# Patient Record
Sex: MALE | Race: BLACK OR AFRICAN AMERICAN | NOT HISPANIC OR LATINO
[De-identification: names, ages, dates, MRNs, and addresses within clinical notes are randomized per-mention and may not be internally consistent; named-entity substitution may affect disease eponyms.]

---

## 2020-01-07 PROBLEM — Z00.00 ENCOUNTER FOR PREVENTIVE HEALTH EXAMINATION: Status: ACTIVE | Noted: 2020-01-07

## 2020-01-13 ENCOUNTER — APPOINTMENT (OUTPATIENT)
Dept: ORTHOPEDIC SURGERY | Facility: CLINIC | Age: 50
End: 2020-01-13
Payer: MEDICAID

## 2020-01-13 VITALS — DIASTOLIC BLOOD PRESSURE: 61 MMHG | HEART RATE: 66 BPM | SYSTOLIC BLOOD PRESSURE: 115 MMHG

## 2020-01-13 VITALS — HEIGHT: 72 IN | BODY MASS INDEX: 28.44 KG/M2 | WEIGHT: 210 LBS

## 2020-01-13 DIAGNOSIS — Z78.9 OTHER SPECIFIED HEALTH STATUS: ICD-10-CM

## 2020-01-13 DIAGNOSIS — Z87.39 PERSONAL HISTORY OF OTHER DISEASES OF THE MUSCULOSKELETAL SYSTEM AND CONNECTIVE TISSUE: ICD-10-CM

## 2020-01-13 DIAGNOSIS — F19.90 OTHER PSYCHOACTIVE SUBSTANCE USE, UNSPECIFIED, UNCOMPLICATED: ICD-10-CM

## 2020-01-13 PROCEDURE — 72100 X-RAY EXAM L-S SPINE 2/3 VWS: CPT

## 2020-01-13 PROCEDURE — 99204 OFFICE O/P NEW MOD 45 MIN: CPT

## 2020-01-13 RX ORDER — METAXALONE 800 MG/1
800 TABLET ORAL
Refills: 0 | Status: ACTIVE | COMMUNITY

## 2020-01-13 RX ORDER — IBUPROFEN 800 MG/1
TABLET, FILM COATED ORAL
Refills: 0 | Status: ACTIVE | COMMUNITY

## 2020-01-13 RX ORDER — OXYCODONE HYDROCHLORIDE AND ACETAMINOPHEN 10; 325 MG/1; MG/1
TABLET ORAL
Refills: 0 | Status: ACTIVE | COMMUNITY

## 2020-01-27 ENCOUNTER — FORM ENCOUNTER (OUTPATIENT)
Age: 50
End: 2020-01-27

## 2020-01-28 ENCOUNTER — OUTPATIENT (OUTPATIENT)
Dept: OUTPATIENT SERVICES | Facility: HOSPITAL | Age: 50
LOS: 1 days | End: 2020-01-28
Payer: COMMERCIAL

## 2020-01-28 ENCOUNTER — APPOINTMENT (OUTPATIENT)
Dept: MRI IMAGING | Facility: CLINIC | Age: 50
End: 2020-01-28
Payer: MEDICAID

## 2020-01-28 DIAGNOSIS — Z00.8 ENCOUNTER FOR OTHER GENERAL EXAMINATION: ICD-10-CM

## 2020-01-28 PROCEDURE — 72158 MRI LUMBAR SPINE W/O & W/DYE: CPT | Mod: 26

## 2020-01-28 PROCEDURE — A9585: CPT

## 2020-01-28 PROCEDURE — 72158 MRI LUMBAR SPINE W/O & W/DYE: CPT

## 2020-02-03 ENCOUNTER — APPOINTMENT (OUTPATIENT)
Dept: ORTHOPEDIC SURGERY | Facility: CLINIC | Age: 50
End: 2020-02-03
Payer: MEDICAID

## 2020-02-03 DIAGNOSIS — M48.07 SPINAL STENOSIS, LUMBOSACRAL REGION: ICD-10-CM

## 2020-02-03 PROCEDURE — 99214 OFFICE O/P EST MOD 30 MIN: CPT

## 2020-03-23 ENCOUNTER — APPOINTMENT (OUTPATIENT)
Dept: NEUROLOGY | Facility: CLINIC | Age: 50
End: 2020-03-23

## 2022-01-05 ENCOUNTER — APPOINTMENT (OUTPATIENT)
Dept: ORTHOPEDIC SURGERY | Facility: CLINIC | Age: 52
End: 2022-01-05

## 2022-03-24 ENCOUNTER — APPOINTMENT (OUTPATIENT)
Dept: ORTHOPEDIC SURGERY | Facility: CLINIC | Age: 52
End: 2022-03-24
Payer: MEDICAID

## 2022-03-24 VITALS — HEIGHT: 72 IN | BODY MASS INDEX: 28.44 KG/M2 | WEIGHT: 210 LBS

## 2022-03-24 PROCEDURE — 99213 OFFICE O/P EST LOW 20 MIN: CPT | Mod: 57

## 2022-03-24 PROCEDURE — 27520 TREAT KNEECAP FRACTURE: CPT | Mod: RT

## 2022-03-24 NOTE — HISTORY OF PRESENT ILLNESS
[de-identified] :  50 Yo male presents with complaint of R knee pain and swelling after a fall 3/20/22. He was seen at [facility] and  with R patella fracture. HE has been WBAT with cane which he uses at baseline. He had prior hx of R knee surgery 19+ years ago. Has hx of long term narcotic use for lower back pain and is under the care of Dr Hagan who is his pain management specialist. PT is a current everyday Smoker.

## 2022-03-24 NOTE — DISCUSSION/SUMMARY
[de-identified] : 52 yo M with non displaced patella fracture with intact extensor mechanism in the setting of advanced tricompartmental osteoarthritis. \par -The risks and benefits of operative versus nonoperative management were discussed at length with the patient. The patient shows a good understanding of the injury and treatment options. They would like to move forward with nonoperative management of the distal pole patella fracture. \par -Weightbearing as tolerated\par -Hinged knee brace 0 to 30 degrees\par -Tylenol for pain\par -Smoking cessation discussed with the patient\par -Follow-up in\par -All of their questions and concerns were addressed.\par

## 2022-03-24 NOTE — PHYSICAL EXAM
[de-identified] : physical exam R knee \par prior incision fully healed + swelling and resolving ecchymosis \par SILT TN SPN DPN SN \par knee ROM 0-30 degrees able to SLR against gravity \par FROM ankle foot and toes \par NVi distally 2+ distal pulses  [de-identified] : 3 views R knee brought in by the patient dated 3/20/22\par - There is severe advanced tricompartmental osteoarthritis there is a non displaced patella fracture with  transverse and longitudinal components which are non displaced. The patella is centralized appropriately.  no other acute fractures or dislocations  \par

## 2022-04-28 ENCOUNTER — APPOINTMENT (OUTPATIENT)
Dept: ORTHOPEDIC SURGERY | Facility: CLINIC | Age: 52
End: 2022-04-28
Payer: MEDICAID

## 2022-04-28 PROCEDURE — 73564 X-RAY EXAM KNEE 4 OR MORE: CPT | Mod: RT

## 2022-04-28 PROCEDURE — 99213 OFFICE O/P EST LOW 20 MIN: CPT

## 2022-04-28 NOTE — DISCUSSION/SUMMARY
[de-identified] : 50 yo M with non displaced patella fracture treated nonoperatively with intact extensor mechanism in the setting of advanced tricompartmental osteoarthritis.  \par -Patient's primary complaint of pain is not at the patella at this time.  Is unclear if there is a loose body in his knee.\par -MRI right knee to assess soft tissue and potential loose body.\par -Hinged knee brace 0 to 90 degrees\par -Tylenol for pain\par -Smoking cessation discussed with the patient\par -Follow-up after MRI\par -All of his questions and concerns were addressed.\par

## 2022-04-28 NOTE — PHYSICAL EXAM
[de-identified] : physical exam R knee \par prior incision fully healed + swelling and no ecchymosis \par SILT TN SPN DPN SN \par knee ROM 0-90 degrees able to SLR against gravity \par FROM ankle foot and toes \par NVi distally 2+ distal pulses  [de-identified] : X-rays of the right knee were taken in the office including AP internal rotation external rotation and lateral.  X-rays show tricompartmental osteoarthritis with osteophytes throughout his knee.  There is no displacement of the patella fracture.  There is also a well-corticated ossicle at the distal aspect of the patella.\par \par

## 2022-04-28 NOTE — HISTORY OF PRESENT ILLNESS
[de-identified] :  52 Yo male for nondisplaced patella fracture treated nonoperatively after a fall 3/20/22. He was seen at [facility] and  with R patella fracture. He has been WBAT with cane which he uses at baseline. He had prior hx of R knee surgery 19+ years ago. Has hx of long term narcotic use for lower back pain and is under the care of Dr Hagan who is his pain management specialist. PT is a current everyday Smoker. \par \par Since his last visit he is doing well.  His primary complaint is pain not at the patella but at a palpable fixed nodule on the medial aspect of his knee.  There is an outpouching of the soft tissue around this area as well.  He has a history of surgery for dislocating patella on the side.  He has osteophytes on previous x-rays.

## 2022-05-09 ENCOUNTER — OUTPATIENT (OUTPATIENT)
Dept: OUTPATIENT SERVICES | Facility: HOSPITAL | Age: 52
LOS: 1 days | End: 2022-05-09
Payer: COMMERCIAL

## 2022-05-09 ENCOUNTER — RESULT REVIEW (OUTPATIENT)
Age: 52
End: 2022-05-09

## 2022-05-09 ENCOUNTER — APPOINTMENT (OUTPATIENT)
Dept: MRI IMAGING | Facility: CLINIC | Age: 52
End: 2022-05-09
Payer: MEDICAID

## 2022-05-09 DIAGNOSIS — S82.001P: ICD-10-CM

## 2022-05-09 PROCEDURE — 73721 MRI JNT OF LWR EXTRE W/O DYE: CPT

## 2022-05-09 PROCEDURE — 74018 RADEX ABDOMEN 1 VIEW: CPT | Mod: 26

## 2022-05-09 PROCEDURE — 74018 RADEX ABDOMEN 1 VIEW: CPT

## 2022-05-09 PROCEDURE — 73721 MRI JNT OF LWR EXTRE W/O DYE: CPT | Mod: 26,RT

## 2022-05-26 ENCOUNTER — APPOINTMENT (OUTPATIENT)
Dept: ORTHOPEDIC SURGERY | Facility: CLINIC | Age: 52
End: 2022-05-26

## 2022-05-26 VITALS — BODY MASS INDEX: 27.77 KG/M2 | HEIGHT: 72 IN | WEIGHT: 205 LBS

## 2022-05-26 DIAGNOSIS — M25.561 PAIN IN RIGHT KNEE: ICD-10-CM

## 2022-05-26 PROCEDURE — 99024 POSTOP FOLLOW-UP VISIT: CPT

## 2022-09-08 ENCOUNTER — APPOINTMENT (OUTPATIENT)
Dept: ORTHOPEDIC SURGERY | Facility: CLINIC | Age: 52
End: 2022-09-08

## 2022-10-20 ENCOUNTER — APPOINTMENT (OUTPATIENT)
Dept: ORTHOPEDIC SURGERY | Facility: CLINIC | Age: 52
End: 2022-10-20

## 2022-10-20 VITALS
HEIGHT: 72 IN | TEMPERATURE: 97.3 F | SYSTOLIC BLOOD PRESSURE: 122 MMHG | WEIGHT: 205 LBS | BODY MASS INDEX: 27.77 KG/M2 | DIASTOLIC BLOOD PRESSURE: 75 MMHG | OXYGEN SATURATION: 99 % | HEART RATE: 59 BPM

## 2022-10-20 DIAGNOSIS — S82.091A OTHER FRACTURE OF RIGHT PATELLA, INITIAL ENCOUNTER FOR CLOSED FRACTURE: ICD-10-CM

## 2022-10-20 DIAGNOSIS — S82.001P UNSPECIFIED FRACTURE OF RIGHT PATELLA, SUBSEQUENT ENCOUNTER FOR CLOSED FRACTURE WITH MALUNION: ICD-10-CM

## 2022-10-20 PROCEDURE — 99213 OFFICE O/P EST LOW 20 MIN: CPT

## 2022-10-20 PROCEDURE — 73562 X-RAY EXAM OF KNEE 3: CPT | Mod: RT

## 2022-11-02 PROBLEM — M25.561 KNEE PAIN, RIGHT: Status: ACTIVE | Noted: 2022-01-04

## 2022-11-02 NOTE — HISTORY OF PRESENT ILLNESS
[de-identified] : AMAYA Hanson is a 51 y.o. male with a right nondisplaced patella fracture treated nonoperatively after a fall 3/20/22, approximately 10 weeks out. He states the pain has lessened, however, he still feels cracking and popping when extending his knee. Denies any paraesthesia to the lower extremity.

## 2022-11-02 NOTE — DISCUSSION/SUMMARY
[de-identified] : 51 -year-old gentleman with a right non displaced patella fracture treated nonoperatively with intact extensor mechanism in the setting of advanced tricompartmental osteoarthritis, approximately 7 months out.\par -Weightbearing as tolerated\par -Patient would like to continue with physical therapy\par -Meloxicam for pain\par -Follow-up prn with weightbearing knee xrays at that time.\par -All of his questions and concerns were addressed.\par

## 2022-11-02 NOTE — HISTORY OF PRESENT ILLNESS
[de-identified] : AMAYA Hanson is a 51 y.o. gentleman for a right nondisplaced patella fracture treated nonoperatively after a fall 3/20/22, approximately 7 months out. Since his last visit he states he is feeling progressively better. He reports he has been doing PT 2-3x/week, which has been helping. He reports some swelling and "clicking" in right knee.  Previous MRI shows significant tricompartmental arthritis.\par \par

## 2022-11-02 NOTE — PHYSICAL EXAM
[de-identified] : The patient is sitting comfortably in the exam room. \par Right knee\par -Skin is intact, no swelling, no ecchymosis\par -Range of motion knee 0-120\par -Mild pain with palpation of the insertion of the patella tendon at the inferior pole of the patella\par -Negative Lachman, negative anterior drawer, negative posterior drawer\par -Negative Rashmi\par -Sensation is intact L1-S1\par -5/5 EHL, FHL, TA, GS, quadriceps, hamstrings\par -Foot is warm and well-perfused, palpable dorsalis pedis pulse\par  [de-identified] : Xrays of the right knee were taken in the office today, 10/20/22.  AP, oblique, lateral.  X-rays show good overall alignment of the knee.  There are osteophytes.  The x-rays are nonweightbearing.  The nondisplaced transverse patella fracture in the proximal aspect of the patella has healed.  There is an ossicle at the distal aspect of the patella at the insertion of the patellar tendon.  No new fractures or dislocations

## 2022-11-02 NOTE — REVIEW OF SYSTEMS
[Joint Pain] : joint pain [Joint Stiffness] : joint stiffness [Joint Swelling] : joint swelling [Negative] : Heme/Lymph [[email or web address]] : right knee pain

## 2022-11-02 NOTE — PHYSICAL EXAM
[de-identified] : The patient is sitting comfortably in the exam room. \par Right knee\par -Skin is intact, no swelling, no ecchymosis\par -Range of motion knee 0-120\par -Negative Lachman, negative anterior drawer, negative posterior drawer\par -Negative Rashmi\par -Sensation is intact L1-S1\par -5/5 EHL, FHL, TA, GS, quadriceps, hamstrings\par -Foot is warm and well-perfused, palpable dorsalis pedis pulse\par  [de-identified] : No Xrays were taken in the office today, 5/26/22.\par \par EXAM: [id number] - MR KNEE RT - ORDERED BY: FESTUS ROJAS\par \par \par PROCEDURE DATE: 05/09/2022\par \par \par \par INTERPRETATION: History: Pain. Assess for loose body. History of patellar fracture with malunion.\par \par Technique: Multiplanar and multisequence MRI images were obtained through the right knee without contrast.\par \par Comparison: None.\par \par Findings:\par \par LIGAMENTS: The collateral and cruciate ligaments are intact.\par \par MEDIAL COMPARTMENT: Meniscus is intact. There is extensive cartilage loss in the medial compartment with bulky osteophyte formation and high-grade cartilage thinning along the weightbearing surface of medial femoral condyle and medial tibial plateau.\par \par LATERAL COMPARTMENT: Meniscus is intact. There is patchy bone marrow edema at the posterolateral femoral condyle with overlying 1.6 cm (TR) delaminating deep fissure of the overlying articular cartilage (series 3 image 21). There is an additional 4 mm weightbearing surface lateral femoral condylar high-grade cartilage defect.\par \par PATELLOFEMORAL COMPARTMENT: There is moderate tendinosis of the quadriceps tendon insertion. There is diffuse severe tendinosis of the patellar tendon with chronic fragmentation at the inferior pole of the patella, without evidence of tendon tear.\par \par There is advanced full-thickness cartilage loss throughout the lateral patellar facet and multiple areas of cartilage thinning involving the trochlea.\par \par JOINT: There is a moderate effusion. There is a small popliteal cyst.\par \par SOFT TISSUES: Mild subcutaneous pretibial edema.\par Edit above\par \par IMPRESSION:\par 1. Severe diffuse patellar tendinosis with a chronically fragmented inferior patellar pole. No evidence of tendon tear.\par \par 2. Moderate tendinosis of the distal quadriceps tendon insertion.\par \par 3. Tricompartment degenerative changes with extensive cartilage loss in the medial and patellofemoral compartments as described. Posterolateral femoral condyle demonstrates a deep fissure with delamination of the articular cartilage and patchy subchondral edema.\par \par 4. No meniscal tear or acute ligamentous injury.\par \par 5. Moderate effusion. Small popliteal cyst.\par \par --- End of Report ---

## 2022-11-02 NOTE — DISCUSSION/SUMMARY
[de-identified] : 51-year-old gentleman with a right non displaced patella fracture treated nonoperatively with intact extensor mechanism in the setting of advanced tricompartmental osteoarthritis, approximately 10 weeks out.\par \par -Transition out of Hinged knee brace \par -Physical Therapy, gait training, range of motion exercises\par -Follow-up in 3 months with xrays at that time.\par -All of his questions and concerns were addressed.\par

## 2025-05-30 ENCOUNTER — OUTPATIENT (OUTPATIENT)
Dept: OUTPATIENT SERVICES | Facility: HOSPITAL | Age: 55
LOS: 1 days | End: 2025-05-30

## 2025-05-30 VITALS
DIASTOLIC BLOOD PRESSURE: 72 MMHG | TEMPERATURE: 98 F | HEART RATE: 71 BPM | SYSTOLIC BLOOD PRESSURE: 121 MMHG | WEIGHT: 203.93 LBS | OXYGEN SATURATION: 99 % | HEIGHT: 72 IN

## 2025-05-30 DIAGNOSIS — I25.10 ATHEROSCLEROTIC HEART DISEASE OF NATIVE CORONARY ARTERY WITHOUT ANGINA PECTORIS: ICD-10-CM

## 2025-05-30 DIAGNOSIS — Z95.5 PRESENCE OF CORONARY ANGIOPLASTY IMPLANT AND GRAFT: Chronic | ICD-10-CM

## 2025-05-30 DIAGNOSIS — Z98.890 OTHER SPECIFIED POSTPROCEDURAL STATES: Chronic | ICD-10-CM

## 2025-05-30 DIAGNOSIS — D12.6 BENIGN NEOPLASM OF COLON, UNSPECIFIED: ICD-10-CM

## 2025-05-30 DIAGNOSIS — Z98.1 ARTHRODESIS STATUS: Chronic | ICD-10-CM

## 2025-05-30 NOTE — H&P PST ADULT - EKG AND INTERPRETATION
2025 with 2025 [facility] cardiology [facility]

## 2025-05-30 NOTE — H&P PST ADULT - CARDIOVASCULAR COMMENTS
CAD, HLD, h/o coronary angioplasty with stent insertion (3) - last 1 inserted > 1yr ago on low dose aspirin

## 2025-05-30 NOTE — H&P PST ADULT - NSICDXPASTMEDICALHX_GEN_ALL_CORE_FT
PAST MEDICAL HISTORY:  Benign neoplasm of colon, unspecified     CAD (coronary artery disease)     HLD (hyperlipidemia)     Marijuana smoker

## 2025-05-30 NOTE — H&P PST ADULT - FUNCTIONAL STATUS
DASI = 9.25 Cosentyx Counseling:  I discussed with the patient the risks of Cosentyx including but not limited to worsening of Crohn's disease, immunosuppression, allergic reactions and infections.  The patient understands that monitoring is required including a PPD at baseline and must alert us or the primary physician if symptoms of infection or other concerning signs are noted.

## 2025-05-30 NOTE — H&P PST ADULT - HISTORY OF PRESENT ILLNESS
54y/o male with pmhx HLD CAD, h/o 3 coronary stents - last 1 inserted >1 yr ago on low dose aspirin.  Pt presents for preop eval for scheduled colonoscopy.  Pt states has h/o polyps and this is routine screening.

## 2025-05-30 NOTE — H&P PST ADULT - LAST CARDIAC ANGIOGRAM/IMAGING
2023 [facility]  - last stent insertion for total of 3 2023 [facility]  - last stent insertion for total of 3 - last 1 inserted > 1yr ago

## 2025-05-30 NOTE — H&P PST ADULT - NSICDXPASTSURGICALHX_GEN_ALL_CORE_FT
PAST SURGICAL HISTORY:  H/O spinal fusion     History of arthroscopy of both knees     History of arthroscopy of both shoulders     History of coronary angioplasty with insertion of stent     History of lumbar laminectomy

## 2025-05-30 NOTE — H&P PST ADULT - NS HP PST LATEX ALLERGY
Pt arrived to Cranston General [facility] INFUSION NURSE 2 [facility]   8/26/2024 10:00 AM [facility] INFUSION NURSE 2 [facility]   9/16/2024 10:00 AM [facility] INFUSION NURSE 2 [facility]   10/7/2024  9:00 AM [facility] INFUSION NURSE 2 [facility]   10/28/2024 10:00 AM [facility] INFUSION NURSE 1 [facility]   11/18/2024 10:00 AM [facility] INFUSION NURSE 2 [facility]   12/9/2024 10:00 AM [facility] INFUSION NURSE 2 [facility]   12/30/2024 11:00 AM [facility] INFUSION NURSE 2 [facility]          No

## 2025-06-03 NOTE — ASU PATIENT PROFILE, ADULT - FALL HARM RISK - UNIVERSAL INTERVENTIONS
Bed in lowest position, wheels locked, appropriate side rails in place/Call bell, personal items and telephone in reach/Instruct patient to call for assistance before getting out of bed or chair/Non-slip footwear when patient is out of bed/[place] to call system/Physically safe environment - no spills, clutter or unnecessary equipment/Purposeful Proactive Rounding/Room/bathroom lighting operational, light cord in reach

## 2025-06-04 ENCOUNTER — OUTPATIENT (OUTPATIENT)
Dept: OUTPATIENT SERVICES | Facility: HOSPITAL | Age: 55
LOS: 1 days | End: 2025-06-04
Payer: MEDICAID

## 2025-06-04 ENCOUNTER — TRANSCRIPTION ENCOUNTER (OUTPATIENT)
Age: 55
End: 2025-06-04

## 2025-06-04 VITALS
SYSTOLIC BLOOD PRESSURE: 132 MMHG | DIASTOLIC BLOOD PRESSURE: 82 MMHG | OXYGEN SATURATION: 98 % | HEART RATE: 55 BPM | RESPIRATION RATE: 17 BRPM

## 2025-06-04 VITALS
HEART RATE: 58 BPM | HEIGHT: 72 IN | OXYGEN SATURATION: 99 % | DIASTOLIC BLOOD PRESSURE: 81 MMHG | RESPIRATION RATE: 16 BRPM | TEMPERATURE: 97 F | WEIGHT: 199.96 LBS | SYSTOLIC BLOOD PRESSURE: 117 MMHG

## 2025-06-04 DIAGNOSIS — Z95.5 PRESENCE OF CORONARY ANGIOPLASTY IMPLANT AND GRAFT: Chronic | ICD-10-CM

## 2025-06-04 DIAGNOSIS — Z98.890 OTHER SPECIFIED POSTPROCEDURAL STATES: Chronic | ICD-10-CM

## 2025-06-04 DIAGNOSIS — D12.6 BENIGN NEOPLASM OF COLON, UNSPECIFIED: ICD-10-CM

## 2025-06-04 DIAGNOSIS — Z98.1 ARTHRODESIS STATUS: Chronic | ICD-10-CM

## 2025-06-04 PROCEDURE — 88305 TISSUE EXAM BY PATHOLOGIST: CPT | Mod: 26

## 2025-06-04 RX ORDER — SODIUM CHLORIDE 9 G/1000ML
500 INJECTION, SOLUTION INTRAVENOUS
Refills: 0 | Status: DISCONTINUED | OUTPATIENT
Start: 2025-06-04 | End: 2025-06-18

## 2025-06-04 RX ORDER — ATORVASTATIN CALCIUM 80 MG/1
1 TABLET, FILM COATED ORAL
Refills: 0 | DISCHARGE

## 2025-06-04 RX ORDER — ASPIRIN 325 MG
1 TABLET ORAL
Refills: 0 | DISCHARGE

## 2025-06-04 NOTE — PRE PROCEDURE NOTE - PRE PROCEDURE EVALUATION
Attending Physician:   Bert                         Procedure: colonoscopy    Indication for Procedure: surveillance (h/o TAs, SSPs)  ________________________________________________________  PAST MEDICAL & SURGICAL HISTORY:  HLD (hyperlipidemia)      CAD (coronary artery disease)      Benign neoplasm of colon, unspecified      Marijuana smoker      History of coronary angioplasty with insertion of stent      History of arthroscopy of both knees      History of arthroscopy of both shoulders      History of lumbar laminectomy      H/O spinal fusion        ALLERGIES:  No Known Allergies    HOME MEDICATIONS:  aspirin 81 mg oral tablet: 1 tab(s) orally once a day  atorvastatin 40 mg oral tablet: 1 tab(s) orally once a day      PERTINENT LAB DATA:                      PHYSICAL EXAMINATION:    T(C): --  HR: --  BP: --  RR: --  SpO2: --    Constitutional: NAD  HEENT: Anicteric, EOMI   Neck:  No JVD  Respiratory: Normal respiratory effort, no accessory muscle use  Cardiovascular: S1 and S2, normal rate  Gastrointestinal: BS+, soft, NT/ND  Extremities: No peripheral edema  Neurological: A/O x 3, no focal deficits  Psychiatric: Normal mood, normal affect  Skin: No rashes on exposed skin    COMMENTS:    The patient is a suitable candidate for the planned procedure unless box checked [ ]  No, explain:

## 2025-06-04 NOTE — ASU DISCHARGE PLAN (ADULT/PEDIATRIC) - FINANCIAL ASSISTANCE
[facility] provides services at a reduced cost to those who are determined to be eligible through [facility]’s financial assistance program. Information regarding [facility]’s financial assistance program can be found by going to [email or web address] or by calling [phone].

## 2025-06-06 LAB — SURGICAL PATHOLOGY STUDY: SIGNIFICANT CHANGE UP

## (undated) DEVICE — TUBING IV SET GRAVITY 3Y 100" MACRO

## (undated) DEVICE — TUBING SUCTION NONCONDUCTIVE 6MM X 12FT

## (undated) DEVICE — ELCTR GROUNDING PAD ADULT COVIDIEN

## (undated) DEVICE — SALIVA EJECTOR (BLUE)

## (undated) DEVICE — BASIN EMESIS 10IN GRADUATED MAUVE

## (undated) DEVICE — CATH IV SAFE BC 22G X 1" (BLUE)

## (undated) DEVICE — BIOPSY FORCEP COLD DISP

## (undated) DEVICE — GOWN LG

## (undated) DEVICE — DRSG BANDAID 0.75X3"

## (undated) DEVICE — SNARE EXACTO COLD 9MMX230CM

## (undated) DEVICE — DRSG CURITY GAUZE SPONGE 4 X 4" 12-PLY NON-STERILE

## (undated) DEVICE — LUBRICATING JELLY HR ONE SHOT 3G

## (undated) DEVICE — ELCTR ECG CONDUCTIVE ADHESIVE

## (undated) DEVICE — DRSG 2X2

## (undated) DEVICE — CONTAINER FORMALIN 80ML YELLOW

## (undated) DEVICE — TUBING MEDI-VAC W MAXIGRIP CONNECTORS 1/4"X6'

## (undated) DEVICE — POLY TRAP ETRAP

## (undated) DEVICE — PACK IV START WITH CHG

## (undated) DEVICE — BIOPSY FORCEP RADIAL JAW 4 STANDARD WITH NEEDLE